# Patient Record
Sex: MALE | Race: BLACK OR AFRICAN AMERICAN | URBAN - METROPOLITAN AREA
[De-identification: names, ages, dates, MRNs, and addresses within clinical notes are randomized per-mention and may not be internally consistent; named-entity substitution may affect disease eponyms.]

---

## 2023-08-15 ENCOUNTER — TRANSCRIBE ORDERS (OUTPATIENT)
Facility: HOSPITAL | Age: 33
End: 2023-08-15

## 2023-08-15 DIAGNOSIS — H05.89 ORBITAL FLOOR SYNDROME: Primary | ICD-10-CM

## 2023-08-30 ENCOUNTER — HOSPITAL ENCOUNTER (OUTPATIENT)
Facility: HOSPITAL | Age: 33
Discharge: HOME | End: 2023-09-02
Payer: MEDICAID

## 2023-08-30 DIAGNOSIS — H05.89 ORBITAL FLOOR SYNDROME: ICD-10-CM

## 2023-08-30 PROCEDURE — 6360000004 HC RX CONTRAST MEDICATION: Performed by: OPHTHALMOLOGY

## 2023-08-30 PROCEDURE — 70482 CT ORBIT/EAR/FOSSA W/O&W/DYE: CPT

## 2023-08-30 RX ADMIN — IOPAMIDOL 100 ML: 755 INJECTION, SOLUTION INTRAVENOUS at 16:01

## 2023-11-07 ENCOUNTER — HOSPITAL ENCOUNTER (INPATIENT)
Facility: HOSPITAL | Age: 33
Discharge: HOME | End: 2023-11-10
Payer: MEDICAID

## 2023-11-07 ENCOUNTER — TRANSCRIBE ORDERS (OUTPATIENT)
Facility: HOSPITAL | Age: 33
End: 2023-11-07

## 2023-11-07 DIAGNOSIS — R06.02 SHORTNESS OF BREATH: ICD-10-CM

## 2023-11-07 DIAGNOSIS — R06.02 SHORTNESS OF BREATH: Primary | ICD-10-CM

## 2023-11-07 PROCEDURE — 71046 X-RAY EXAM CHEST 2 VIEWS: CPT

## 2023-12-31 ENCOUNTER — HOSPITAL ENCOUNTER (EMERGENCY)
Facility: HOSPITAL | Age: 33
Discharge: HOME | End: 2023-12-31
Attending: EMERGENCY MEDICINE
Payer: MEDICAID

## 2023-12-31 VITALS
RESPIRATION RATE: 16 BRPM | BODY MASS INDEX: 22.4 KG/M2 | OXYGEN SATURATION: 99 % | HEART RATE: 100 BPM | TEMPERATURE: 98.3 F | SYSTOLIC BLOOD PRESSURE: 146 MMHG | HEIGHT: 71 IN | WEIGHT: 160 LBS | DIASTOLIC BLOOD PRESSURE: 97 MMHG

## 2023-12-31 DIAGNOSIS — F41.1 ANXIETY STATE: Primary | ICD-10-CM

## 2023-12-31 DIAGNOSIS — F19.90 SUBSTANCE USE: ICD-10-CM

## 2023-12-31 PROCEDURE — 6370000000 HC RX 637 (ALT 250 FOR IP): Performed by: EMERGENCY MEDICINE

## 2023-12-31 PROCEDURE — 99283 EMERGENCY DEPT VISIT LOW MDM: CPT

## 2023-12-31 RX ORDER — ALPRAZOLAM 0.5 MG/1
0.5 TABLET ORAL
Status: COMPLETED | OUTPATIENT
Start: 2023-12-31 | End: 2023-12-31

## 2023-12-31 RX ADMIN — ALPRAZOLAM 0.5 MG: 0.5 TABLET ORAL at 21:10

## 2023-12-31 ASSESSMENT — LIFESTYLE VARIABLES
HOW OFTEN DO YOU HAVE A DRINK CONTAINING ALCOHOL: NEVER
HOW MANY STANDARD DRINKS CONTAINING ALCOHOL DO YOU HAVE ON A TYPICAL DAY: PATIENT DOES NOT DRINK

## 2023-12-31 ASSESSMENT — PAIN - FUNCTIONAL ASSESSMENT: PAIN_FUNCTIONAL_ASSESSMENT: NONE - DENIES PAIN

## 2024-01-01 NOTE — ED TRIAGE NOTES
States anxiety that started after going to a bar on 12/23 and \"given some drug but I don't know what it was\" States daily marijuana and ecstasy use.

## 2024-01-01 NOTE — ED PROVIDER NOTES
homicidal nor having auditory visual hallucinations or findings consistent with psychotic break.  Comfortable patient's plan for discharge.  He was given return precautions.    Clinical Management Tools:  Not Applicable    Records Reviewed (source and summary of external notes): Prior medical records and Nursing notes    Vitals:    Vitals:    12/31/23 2028   BP: (!) 146/97   Pulse: 100   Resp: 16   Temp: 98.3 °F (36.8 °C)   TempSrc: Oral   SpO2: 99%   Weight: 72.6 kg (160 lb)   Height: 1.803 m (5' 11\")        ED COURSE       Sepsis Reassessment: Sepsis reassessment not applicable    Disposition Considerations (Tests not done, Shared Decision Making, Pt Expectation of Test or Treatment.): Not Applicable    Patient was given the following medications:  Medications   ALPRAZolam (XANAX) tablet 0.5 mg (0.5 mg Oral Given 12/31/23 2110)       CONSULTS: (Who and What was discussed)  None     Social Determinants affecting Dx or Tx: Patient lacks a PCP.    Smoking Cessation: Not Applicable    PROCEDURES   Unless otherwise noted above, none.  Procedures      CRITICAL CARE TIME   Patient does not meet Critical Care Time, 0 minutes    FINAL IMPRESSION     1. Anxiety state    2. Substance use          DISPOSITION/PLAN   DISPOSITION Decision To Discharge 12/31/2023 09:12:45 PM    Discharge Note: The patient is stable for discharge home. The signs, symptoms, diagnosis, and discharge instructions have been discussed, understanding conveyed, and agreed upon. The patient is to follow up as recommended or return to ER should their symptoms worsen.      PATIENT REFERRED TO:  Cecelia Valdivia, APRN - NP  [address] [facility] [address]  [phone]    Schedule an appointment as soon as possible for a visit   for PCP ongoing care or follow-up with your own doctor if symptoms persist    [facility] EMERGENCY DEPARTMENT  [address] [place] [id number] [phone]    If symptoms worsen        DISCHARGE

## 2024-01-16 ENCOUNTER — HOSPITAL ENCOUNTER (EMERGENCY)
Facility: HOSPITAL | Age: 34
Discharge: HOME | End: 2024-01-16
Payer: MEDICAID

## 2024-01-16 VITALS
SYSTOLIC BLOOD PRESSURE: 140 MMHG | DIASTOLIC BLOOD PRESSURE: 86 MMHG | HEIGHT: 70 IN | HEART RATE: 65 BPM | TEMPERATURE: 98.3 F | OXYGEN SATURATION: 98 % | RESPIRATION RATE: 14 BRPM | WEIGHT: 183 LBS | BODY MASS INDEX: 26.2 KG/M2

## 2024-01-16 DIAGNOSIS — F12.90 MARIJUANA USE: ICD-10-CM

## 2024-01-16 DIAGNOSIS — F41.9 ANXIETY: Primary | ICD-10-CM

## 2024-01-16 PROCEDURE — 99282 EMERGENCY DEPT VISIT SF MDM: CPT

## 2024-01-16 ASSESSMENT — PAIN SCALES - GENERAL
PAINLEVEL_OUTOF10: 0
PAINLEVEL_OUTOF10: 0

## 2024-01-16 ASSESSMENT — PAIN - FUNCTIONAL ASSESSMENT
PAIN_FUNCTIONAL_ASSESSMENT: NONE - DENIES PAIN
PAIN_FUNCTIONAL_ASSESSMENT: NONE - DENIES PAIN
PAIN_FUNCTIONAL_ASSESSMENT: 0-10

## 2024-01-16 NOTE — ED TRIAGE NOTES
Pt says he had an \"anxiety attack\" last night and it continued on today. Pt denies SI/HI. Pt denies history of anxiety. Pt says when he is having an anxiety attack he just feels like \"a lot is going on\" but declined to give me any other information.

## 2024-01-17 NOTE — ED PROVIDER NOTES
computer software. Please disregards these errors. Please excuse any errors that have escaped final proofreading.)     Paula Bowers APRN - ERNESTINE  01/17/24 [id number]

## 2024-01-29 ENCOUNTER — HOSPITAL ENCOUNTER (EMERGENCY)
Facility: HOSPITAL | Age: 34
Discharge: HOME | End: 2024-01-29
Attending: STUDENT IN AN ORGANIZED HEALTH CARE EDUCATION/TRAINING PROGRAM
Payer: MEDICAID

## 2024-01-29 VITALS
HEIGHT: 70 IN | OXYGEN SATURATION: 98 % | SYSTOLIC BLOOD PRESSURE: 133 MMHG | BODY MASS INDEX: 25.77 KG/M2 | WEIGHT: 180 LBS | DIASTOLIC BLOOD PRESSURE: 81 MMHG | TEMPERATURE: 98.6 F | RESPIRATION RATE: 16 BRPM | HEART RATE: 82 BPM

## 2024-01-29 DIAGNOSIS — F22 PARANOIA (HCC): Primary | ICD-10-CM

## 2024-01-29 LAB
ALBUMIN SERPL-MCNC: 3.9 G/DL (ref 3.5–5)
ALBUMIN/GLOB SERPL: 1.1 (ref 1.1–2.2)
ALP SERPL-CCNC: 60 U/L (ref 45–117)
ALT SERPL-CCNC: 28 U/L (ref 12–78)
AMPHET UR QL SCN: NEGATIVE
ANION GAP SERPL CALC-SCNC: 2 MMOL/L (ref 5–15)
APAP SERPL-MCNC: <2 UG/ML (ref 10–30)
APPEARANCE UR: CLEAR
AST SERPL W P-5'-P-CCNC: 9 U/L (ref 15–37)
BACTERIA URNS QL MICRO: NEGATIVE /HPF
BARBITURATES UR QL SCN: NEGATIVE
BASOPHILS # BLD: 0 K/UL (ref 0–0.1)
BASOPHILS NFR BLD: 1 % (ref 0–1)
BENZODIAZ UR QL: NEGATIVE
BILIRUB SERPL-MCNC: 0.6 MG/DL (ref 0.2–1)
BILIRUB UR QL: NEGATIVE
BUN SERPL-MCNC: 16 MG/DL (ref 6–20)
BUN/CREAT SERPL: 10 (ref 12–20)
CA-I BLD-MCNC: 9.5 MG/DL (ref 8.5–10.1)
CANNABINOIDS UR QL SCN: POSITIVE
CHLORIDE SERPL-SCNC: 110 MMOL/L (ref 97–108)
CO2 SERPL-SCNC: 26 MMOL/L (ref 21–32)
COCAINE UR QL SCN: NEGATIVE
COLOR UR: NORMAL
CREAT SERPL-MCNC: 1.65 MG/DL (ref 0.7–1.3)
DIFFERENTIAL METHOD BLD: ABNORMAL
EOSINOPHIL # BLD: 0 K/UL (ref 0–0.4)
EOSINOPHIL NFR BLD: 1 % (ref 0–7)
EPITH CASTS URNS QL MICRO: NORMAL /LPF
ERYTHROCYTE [DISTWIDTH] IN BLOOD BY AUTOMATED COUNT: 12.9 % (ref 11.5–14.5)
ETHANOL SERPL-MCNC: <10 MG/DL (ref 0–0.08)
FLUAV RNA SPEC QL NAA+PROBE: NOT DETECTED
FLUBV RNA SPEC QL NAA+PROBE: NOT DETECTED
GLOBULIN SER CALC-MCNC: 3.6 G/DL (ref 2–4)
GLUCOSE SERPL-MCNC: 85 MG/DL (ref 65–100)
GLUCOSE UR STRIP.AUTO-MCNC: NEGATIVE MG/DL
HCT VFR BLD AUTO: 44.8 % (ref 36.6–50.3)
HGB BLD-MCNC: 15.4 G/DL (ref 12.1–17)
HGB UR QL STRIP: NEGATIVE
IMM GRANULOCYTES # BLD AUTO: 0 K/UL (ref 0–0.04)
IMM GRANULOCYTES NFR BLD AUTO: 0 % (ref 0–0.5)
KETONES UR QL STRIP.AUTO: NEGATIVE MG/DL
LEUKOCYTE ESTERASE UR QL STRIP.AUTO: NEGATIVE
LYMPHOCYTES # BLD: 1.7 K/UL (ref 0.8–3.5)
LYMPHOCYTES NFR BLD: 34 % (ref 12–49)
Lab: ABNORMAL
MCH RBC QN AUTO: 27.3 PG (ref 26–34)
MCHC RBC AUTO-ENTMCNC: 34.4 G/DL (ref 30–36.5)
MCV RBC AUTO: 79.3 FL (ref 80–99)
METHADONE UR QL: NEGATIVE
MONOCYTES # BLD: 0.5 K/UL (ref 0–1)
MONOCYTES NFR BLD: 10 % (ref 5–13)
MUCOUS THREADS URNS QL MICRO: NORMAL /LPF
NEUTS SEG # BLD: 2.8 K/UL (ref 1.8–8)
NEUTS SEG NFR BLD: 54 % (ref 32–75)
NITRITE UR QL STRIP.AUTO: NEGATIVE
NRBC # BLD: 0 K/UL (ref 0–0.01)
NRBC BLD-RTO: 0 PER 100 WBC
OPIATES UR QL: NEGATIVE
PCP UR QL: NEGATIVE
PH UR STRIP: 5 (ref 5–8)
PLATELET # BLD AUTO: 226 K/UL (ref 150–400)
PMV BLD AUTO: 11 FL (ref 8.9–12.9)
POTASSIUM SERPL-SCNC: 4 MMOL/L (ref 3.5–5.1)
PROT SERPL-MCNC: 7.5 G/DL (ref 6.4–8.2)
PROT UR STRIP-MCNC: NEGATIVE MG/DL
RBC # BLD AUTO: 5.65 M/UL (ref 4.1–5.7)
RBC #/AREA URNS HPF: NORMAL /HPF (ref 0–5)
SALICYLATES SERPL-MCNC: 4.7 MG/DL (ref 2.8–20)
SARS-COV-2 RNA RESP QL NAA+PROBE: NOT DETECTED
SODIUM SERPL-SCNC: 138 MMOL/L (ref 136–145)
SP GR UR REFRACTOMETRY: 1.02 (ref 1–1.03)
UROBILINOGEN UR QL STRIP.AUTO: 0.1 EU/DL (ref 0.1–1)
WBC # BLD AUTO: 5.1 K/UL (ref 4.1–11.1)
WBC URNS QL MICRO: NORMAL /HPF (ref 0–4)

## 2024-01-29 PROCEDURE — 82077 ASSAY SPEC XCP UR&BREATH IA: CPT

## 2024-01-29 PROCEDURE — 80179 DRUG ASSAY SALICYLATE: CPT

## 2024-01-29 PROCEDURE — 99283 EMERGENCY DEPT VISIT LOW MDM: CPT

## 2024-01-29 PROCEDURE — 81001 URINALYSIS AUTO W/SCOPE: CPT

## 2024-01-29 PROCEDURE — 80143 DRUG ASSAY ACETAMINOPHEN: CPT

## 2024-01-29 PROCEDURE — 80053 COMPREHEN METABOLIC PANEL: CPT

## 2024-01-29 PROCEDURE — 6370000000 HC RX 637 (ALT 250 FOR IP): Performed by: STUDENT IN AN ORGANIZED HEALTH CARE EDUCATION/TRAINING PROGRAM

## 2024-01-29 PROCEDURE — 36415 COLL VENOUS BLD VENIPUNCTURE: CPT

## 2024-01-29 PROCEDURE — 85025 COMPLETE CBC W/AUTO DIFF WBC: CPT

## 2024-01-29 PROCEDURE — 87636 SARSCOV2 & INF A&B AMP PRB: CPT

## 2024-01-29 PROCEDURE — 80307 DRUG TEST PRSMV CHEM ANLYZR: CPT

## 2024-01-29 RX ORDER — HYDROXYZINE HYDROCHLORIDE 25 MG/1
25 TABLET, FILM COATED ORAL ONCE
Status: COMPLETED | OUTPATIENT
Start: 2024-01-29 | End: 2024-01-29

## 2024-01-29 RX ADMIN — HYDROXYZINE HYDROCHLORIDE 25 MG: 25 TABLET, FILM COATED ORAL at 18:44

## 2024-01-29 ASSESSMENT — PAIN SCALES - GENERAL: PAINLEVEL_OUTOF10: 0

## 2024-01-29 ASSESSMENT — LIFESTYLE VARIABLES
HOW MANY STANDARD DRINKS CONTAINING ALCOHOL DO YOU HAVE ON A TYPICAL DAY: PATIENT DOES NOT DRINK
HOW OFTEN DO YOU HAVE A DRINK CONTAINING ALCOHOL: NEVER
HOW MANY STANDARD DRINKS CONTAINING ALCOHOL DO YOU HAVE ON A TYPICAL DAY: PATIENT DOES NOT DRINK

## 2024-01-29 NOTE — ED TRIAGE NOTES
Pt arrived via ems from [facility] with Erratic Behavior , smoked marijuana prior to getting to [facility].     Arrived at [facility] at 2 pm as a self admit.     Has been having drug induced psychosis from marijuana

## 2024-01-30 NOTE — DISCHARGE INSTRUCTIONS
Thank you!  Thank you for allowing me to care for you in the emergency department. It is my goal to provide you with excellent care.  Please fill out the survey that will come to you by mail or email since we listen to your feedback!     Below you will find a list of your tests from today's visit.  Should you have any questions, please do not hesitate to call the emergency department.    Labs  Recent Results (from the past 12 hour(s))   Acetaminophen Level    Collection Time: 01/29/24  5:21 PM   Result Value Ref Range    Acetaminophen Level <2 (L) 10 - 30 ug/mL   CBC with Auto Differential    Collection Time: 01/29/24  5:21 PM   Result Value Ref Range    WBC 5.1 4.1 - 11.1 K/uL    RBC 5.65 4.10 - 5.70 M/uL    Hemoglobin 15.4 12.1 - 17.0 g/dL    Hematocrit 44.8 36.6 - 50.3 %    MCV 79.3 (L) 80.0 - 99.0 FL    MCH 27.3 26.0 - 34.0 PG    MCHC 34.4 30.0 - 36.5 g/dL    RDW 12.9 11.5 - 14.5 %    Platelets 226 150 - 400 K/uL    MPV 11.0 8.9 - 12.9 FL    Nucleated RBCs 0.0 0.0  WBC    nRBC 0.00 0.00 - 0.01 K/uL    Neutrophils % 54 32 - 75 %    Lymphocytes % 34 12 - 49 %    Monocytes % 10 5 - 13 %    Eosinophils % 1 0 - 7 %    Basophils % 1 0 - 1 %    Immature Granulocytes 0 0 - 0.5 %    Neutrophils Absolute 2.8 1.8 - 8.0 K/UL    Lymphocytes Absolute 1.7 0.8 - 3.5 K/UL    Monocytes Absolute 0.5 0.0 - 1.0 K/UL    Eosinophils Absolute 0.0 0.0 - 0.4 K/UL    Basophils Absolute 0.0 0.0 - 0.1 K/UL    Absolute Immature Granulocyte 0.0 0.00 - 0.04 K/UL    Differential Type AUTOMATED     Comprehensive Metabolic Panel    Collection Time: 01/29/24  5:21 PM   Result Value Ref Range    Sodium 138 136 - 145 mmol/L    Potassium 4.0 3.5 - 5.1 mmol/L    Chloride 110 (H) 97 - 108 mmol/L    CO2 26 21 - 32 mmol/L    Anion Gap 2 (L) 5 - 15 mmol/L    Glucose 85 65 - 100 mg/dL    BUN 16 6 - 20 mg/dL    Creatinine 1.65 (H) 0.70 - 1.30 mg/dL    Bun/Cre Ratio 10 (L) 12 - 20      Est, Glom Filt Rate 56 (L) >60 ml/min/1.73m2    Calcium 9.5

## 2024-01-30 NOTE — ED PROVIDER NOTES
[facility] EMERGENCY DEPT  EMERGENCY DEPARTMENT HISTORY AND PHYSICAL EXAM      Date: 1/29/2024  Patient Name: Yesenia Heller  MRN: [id number]  Birthdate 1990  Date of evaluation: 1/29/2024  Provider: Urbano Montemayor MD   Note Started: 10:31 PM EST 1/29/24    HISTORY OF PRESENT ILLNESS     Chief Complaint   Patient presents with    Behavorial Issue       History Provided By: Patient, psychiatric provider    HPI: Yesenia Heller is a 33 y.o. male with past medical history as reviewed below presents for evaluation of paranoia.  Patient initially presented to [facility] for voluntary admission.  They sent him here for medical screening and clearance.  Patient has no complaints at this time.  Denies any medical history.    PAST MEDICAL HISTORY   Past Medical History:  Past Medical History:   Diagnosis Date    Asthma        Past Surgical History:  History reviewed. No pertinent surgical history.    Family History:  History reviewed. No pertinent family history.    Social History:  Social History     Tobacco Use    Smoking status: Every Day     Current packs/day: 2.00     Average packs/day: 2.0 packs/day for 2.1 years (4.2 ttl pk-yrs)     Types: Cigarettes     Start date: 12/16/2021    Smokeless tobacco: Never   Substance Use Topics    Alcohol use: Not Currently    Drug use: Yes     Types: Marijuana (Weed), Other-see comments     Comment: ecstasy       Allergies:  No Known Allergies    PCP: None, None    Current Meds:   No current facility-administered medications for this encounter.     No current outpatient medications on file.       Social Determinants of Health:   Social Determinants of Health     Tobacco Use: High Risk (1/29/2024)    Patient History     Smoking Tobacco Use: Every Day     Smokeless Tobacco Use: Never     Passive Exposure: Not on file   Alcohol Use: Not At Risk (1/29/2024)    AUDIT-C     Frequency of Alcohol Consumption: Never     Average Number of Drinks: Patient does not drink     Frequency

## 2024-05-08 PROBLEM — N28.9 ABNORMAL RENAL FUNCTION: Status: ACTIVE | Noted: 2024-05-08

## 2024-05-08 PROBLEM — F17.200 NICOTINE DEPENDENCE: Status: ACTIVE | Noted: 2024-05-08

## 2024-05-08 PROBLEM — Z11.59 NEED FOR HEPATITIS C SCREENING TEST: Status: ACTIVE | Noted: 2024-05-08

## 2024-05-16 ENCOUNTER — OFFICE VISIT (OUTPATIENT)
Facility: CLINIC | Age: 34
End: 2024-05-16
Payer: MEDICAID

## 2024-05-16 VITALS
HEIGHT: 69 IN | HEART RATE: 69 BPM | RESPIRATION RATE: 18 BRPM | DIASTOLIC BLOOD PRESSURE: 74 MMHG | BODY MASS INDEX: 26.9 KG/M2 | SYSTOLIC BLOOD PRESSURE: 126 MMHG | OXYGEN SATURATION: 98 % | WEIGHT: 181.6 LBS | TEMPERATURE: 98.6 F

## 2024-05-16 DIAGNOSIS — F29 PSYCHOSIS, UNSPECIFIED PSYCHOSIS TYPE (HCC): ICD-10-CM

## 2024-05-16 DIAGNOSIS — Z11.4 SCREENING FOR HIV (HUMAN IMMUNODEFICIENCY VIRUS): ICD-10-CM

## 2024-05-16 DIAGNOSIS — F17.218 CIGARETTE NICOTINE DEPENDENCE WITH OTHER NICOTINE-INDUCED DISORDER: ICD-10-CM

## 2024-05-16 DIAGNOSIS — Z11.59 NEED FOR HEPATITIS C SCREENING TEST: ICD-10-CM

## 2024-05-16 DIAGNOSIS — N28.9 ABNORMAL RENAL FUNCTION: ICD-10-CM

## 2024-05-16 DIAGNOSIS — E55.9 VITAMIN D DEFICIENCY: ICD-10-CM

## 2024-05-16 DIAGNOSIS — F19.11 HISTORY OF DRUG ABUSE (HCC): ICD-10-CM

## 2024-05-16 PROCEDURE — 99204 OFFICE O/P NEW MOD 45 MIN: CPT | Performed by: INTERNAL MEDICINE

## 2024-05-16 RX ORDER — RISPERIDONE 2 MG/1
2 TABLET ORAL 2 TIMES DAILY
COMMUNITY
Start: 2024-02-09

## 2024-05-16 SDOH — ECONOMIC STABILITY: FOOD INSECURITY: WITHIN THE PAST 12 MONTHS, YOU WORRIED THAT YOUR FOOD WOULD RUN OUT BEFORE YOU GOT MONEY TO BUY MORE.: OFTEN TRUE

## 2024-05-16 SDOH — ECONOMIC STABILITY: FOOD INSECURITY: WITHIN THE PAST 12 MONTHS, THE FOOD YOU BOUGHT JUST DIDN'T LAST AND YOU DIDN'T HAVE MONEY TO GET MORE.: OFTEN TRUE

## 2024-05-16 SDOH — ECONOMIC STABILITY: INCOME INSECURITY: HOW HARD IS IT FOR YOU TO PAY FOR THE VERY BASICS LIKE FOOD, HOUSING, MEDICAL CARE, AND HEATING?: HARD

## 2024-05-16 SDOH — ECONOMIC STABILITY: HOUSING INSECURITY
IN THE LAST 12 MONTHS, WAS THERE A TIME WHEN YOU DID NOT HAVE A STEADY PLACE TO SLEEP OR SLEPT IN A SHELTER (INCLUDING NOW)?: NO

## 2024-05-16 ASSESSMENT — PATIENT HEALTH QUESTIONNAIRE - PHQ9
5. POOR APPETITE OR OVEREATING: NOT AT ALL
8. MOVING OR SPEAKING SO SLOWLY THAT OTHER PEOPLE COULD HAVE NOTICED. OR THE OPPOSITE, BEING SO FIGETY OR RESTLESS THAT YOU HAVE BEEN MOVING AROUND A LOT MORE THAN USUAL: NOT AT ALL
9. THOUGHTS THAT YOU WOULD BE BETTER OFF DEAD, OR OF HURTING YOURSELF: NOT AT ALL
2. FEELING DOWN, DEPRESSED OR HOPELESS: NOT AT ALL
1. LITTLE INTEREST OR PLEASURE IN DOING THINGS: NOT AT ALL
3. TROUBLE FALLING OR STAYING ASLEEP: NOT AT ALL
7. TROUBLE CONCENTRATING ON THINGS, SUCH AS READING THE NEWSPAPER OR WATCHING TELEVISION: NOT AT ALL
4. FEELING TIRED OR HAVING LITTLE ENERGY: NOT AT ALL
SUM OF ALL RESPONSES TO PHQ QUESTIONS 1-9: 0
SUM OF ALL RESPONSES TO PHQ QUESTIONS 1-9: 0
SUM OF ALL RESPONSES TO PHQ9 QUESTIONS 1 & 2: 0
SUM OF ALL RESPONSES TO PHQ QUESTIONS 1-9: 0
SUM OF ALL RESPONSES TO PHQ QUESTIONS 1-9: 0
10. IF YOU CHECKED OFF ANY PROBLEMS, HOW DIFFICULT HAVE THESE PROBLEMS MADE IT FOR YOU TO DO YOUR WORK, TAKE CARE OF THINGS AT HOME, OR GET ALONG WITH OTHER PEOPLE: NOT DIFFICULT AT ALL
6. FEELING BAD ABOUT YOURSELF - OR THAT YOU ARE A FAILURE OR HAVE LET YOURSELF OR YOUR FAMILY DOWN: NOT AT ALL

## 2024-05-16 ASSESSMENT — ENCOUNTER SYMPTOMS
RESPIRATORY NEGATIVE: 1
GASTROINTESTINAL NEGATIVE: 1
ALLERGIC/IMMUNOLOGIC NEGATIVE: 1
EYES NEGATIVE: 1

## 2024-05-16 NOTE — PROGRESS NOTES
Yesenia Heller (: 1990) is a 34 y.o. male, New patient patient, here for evaluation of the following chief complaint(s):  New Patient and Annual Exam         ASSESSMENT/PLAN:  Below is the assessment and plan developed based on review of pertinent history, physical exam, labs, studies, and medications.      1. Psychosis, unspecified psychosis type (HCC)  -     Roger Tena MD, Psychiatry, [place]  -     -DRUG SCREEN, URINE; Future  2. Abnormal renal function  -     CBC with Auto Differential; Future  -     Comprehensive Metabolic Panel; Future  3. History of drug abuse (HCC)  -     Roger Tena MD, Psychiatry, [place]  -     -DRUG SCREEN, URINE; Future  4. Vitamin D deficiency  -     Vitamin D 25 Hydroxy; Future  5. Need for hepatitis C screening test  -     Hepatitis C Antibody; Future  6. Cigarette nicotine dependence with other nicotine-induced disorder  7. Screening for HIV (human immunodeficiency virus)  -     HIV 1/2 Ag/Ab, 4TH Generation,W Rflx Confirm; Future        Mr. Heller is new patient to me.  He is not providing any history.  He does not know who has referred him.  He says he was in [place] admitted for 11 days for psychosis.  He does not know what mental health problems he has.  He has been admitted repeatedly in the ER for behavioral problem issues and he has been getting risperidone.  He could not give me any history where does he follow and who is his psychiatrist.    He says he lives with his mother and his children and he has his girlfriend.  In the past he used to take marijuana and ecstasy and to drink excessively but he says now he does not drink.  He smokes 1 cigarette lasting for 2 days.    In the past she had abnormal renal function.  He does not know that.    He should not take OTC ibuprofen Aleve like NSAID group of medicines except Tylenol.    He was admitted on 2023, 2024 and 2024.  He used to have substance

## 2024-05-16 NOTE — PATIENT INSTRUCTIONS
[facility] Food Resources*  (Call 211 if need more resources.)    SNAP (formerly Food [employer])  What they offer: SNAP is used like cash to buy eligible food items from authorized retailers.  Apply for benefits online: https://www.commonhelp.[place].gov/  Apply for benefits by phone: [phone] (M-F 8 am - 7 pm; Sat 9 am - 12 pm)    Feed [facility] Emergency Food Hotline  What they offer: Assistance with finding a food pantry, soup kitchen, or resource near you.  Website:  https://Breezeplay.org/agency-network/agency-/ (search zip code)  Hunger Hotline Inquiry Form: https://Breezeplay.org/hunger-hotline-inquiry-form/  Hunger Hotline Phone Number:  804-521-2500 x 631 (M-F 8:00 am-4:00 pm)    Meals on Wheels  Meals on Wheels is a program that delivers meals to individuals who have no reliable means for maintaining a healthy diet.  Services are provided by area.     [facility] Service Area:   [facility], [place], and [place].  [facility], [place], [facility], [facility], [place], [place], and [place]  Website:  https://Breezeplay.org/how-we-help/meals-on-wheels/  To Apply:  Ages 18-59:  Apply online: https://Breezeplay.org/meals-on-wheels-application-form/  Apply by phone: [phone]        Meals on Wheels  [facility] Area (continued):  To Apply:  Ages 60+:  Apply Online: https://Breezeplay.org/meals-on-wheels-application-form/  Apply By Phone: [phone] (M-F 830 am- 5 pm)  For [facility], [facility], [place], [place], [place], [place], [place] and [place]: You may also apply by calling [employer], [facility] Agency on Aging at [phone].  For [facility], [place], and [place] as well as [facility], [place], [place], and [place]:    Contact [facility] Agency on [phone]    [facility] Service Area:   [facility] [place], [place], [place] &

## 2024-05-16 NOTE — PROGRESS NOTES
\"Have you been to the ER, urgent care clinic since your last visit?  Hospitalized since your last visit?\"    YES - When: approximately 01/29/2024 ago.  Where and Why: [facility] for paranoia.    “Have you seen or consulted any other health care providers outside of [facility] since your last visit?”    YES - When: approximately April 2024 ago.  Where and Why: [place] was trying to see a psychiatrist.    Chief Complaint   Patient presents with    New Patient    Annual Exam     /74 (Site: Left Upper Arm, Position: Sitting, Cuff Size: Medium Adult)   Pulse 69   Temp 98.6 °F (37 °C) (Oral)   Resp 18   Ht 1.753 m (5' 9\")   Wt 82.4 kg (181 lb 9.6 oz)   SpO2 98%   BMI 26.82 kg/m²           Click Here for Release of Records Request

## 2024-05-17 DIAGNOSIS — N18.31 STAGE 3A CHRONIC KIDNEY DISEASE (HCC): Primary | ICD-10-CM

## 2024-05-17 LAB
25(OH)D3+25(OH)D2 SERPL-MCNC: 36 NG/ML (ref 30–100)
ALBUMIN SERPL-MCNC: 4.9 G/DL (ref 4.1–5.1)
ALBUMIN/GLOB SERPL: 2 {RATIO} (ref 1.2–2.2)
ALP SERPL-CCNC: 74 IU/L (ref 44–121)
ALT SERPL-CCNC: 22 IU/L (ref 0–44)
AST SERPL-CCNC: 16 IU/L (ref 0–40)
BASOPHILS # BLD AUTO: 0.1 X10E3/UL (ref 0–0.2)
BASOPHILS NFR BLD AUTO: 2 %
BILIRUB SERPL-MCNC: 0.4 MG/DL (ref 0–1.2)
BUN SERPL-MCNC: 16 MG/DL (ref 6–20)
BUN/CREAT SERPL: 9 (ref 9–20)
CALCIUM SERPL-MCNC: 9.6 MG/DL (ref 8.7–10.2)
CHLORIDE SERPL-SCNC: 104 MMOL/L (ref 96–106)
CO2 SERPL-SCNC: 26 MMOL/L (ref 20–29)
CREAT SERPL-MCNC: 1.71 MG/DL (ref 0.76–1.27)
EGFRCR SERPLBLD CKD-EPI 2021: 53 ML/MIN/1.73
EOSINOPHIL # BLD AUTO: 0.2 X10E3/UL (ref 0–0.4)
EOSINOPHIL NFR BLD AUTO: 6 %
ERYTHROCYTE [DISTWIDTH] IN BLOOD BY AUTOMATED COUNT: 13.2 % (ref 11.6–15.4)
GLOBULIN SER CALC-MCNC: 2.5 G/DL (ref 1.5–4.5)
GLUCOSE SERPL-MCNC: 78 MG/DL (ref 70–99)
HCT VFR BLD AUTO: 41.7 % (ref 37.5–51)
HCV IGG SERPL QL IA: NON REACTIVE
HGB BLD-MCNC: 14 G/DL (ref 13–17.7)
HIV 1+2 AB+HIV1 P24 AG SERPL QL IA: NON REACTIVE
IMM GRANULOCYTES # BLD AUTO: 0 X10E3/UL (ref 0–0.1)
IMM GRANULOCYTES NFR BLD AUTO: 0 %
LYMPHOCYTES # BLD AUTO: 1.5 X10E3/UL (ref 0.7–3.1)
LYMPHOCYTES NFR BLD AUTO: 39 %
MCH RBC QN AUTO: 27.5 PG (ref 26.6–33)
MCHC RBC AUTO-ENTMCNC: 33.6 G/DL (ref 31.5–35.7)
MCV RBC AUTO: 82 FL (ref 79–97)
MONOCYTES # BLD AUTO: 0.4 X10E3/UL (ref 0.1–0.9)
MONOCYTES NFR BLD AUTO: 10 %
NEUTROPHILS # BLD AUTO: 1.7 X10E3/UL (ref 1.4–7)
NEUTROPHILS NFR BLD AUTO: 43 %
PLATELET # BLD AUTO: 228 X10E3/UL (ref 150–450)
POTASSIUM SERPL-SCNC: 4.8 MMOL/L (ref 3.5–5.2)
PROT SERPL-MCNC: 7.4 G/DL (ref 6–8.5)
RBC # BLD AUTO: 5.09 X10E6/UL (ref 4.14–5.8)
SODIUM SERPL-SCNC: 142 MMOL/L (ref 134–144)
WBC # BLD AUTO: 3.9 X10E3/UL (ref 3.4–10.8)

## 2024-05-18 LAB
AMPHETAMINES UR QL SCN: NEGATIVE NG/ML
BARBITURATES UR QL SCN: NEGATIVE NG/ML
BENZODIAZ UR QL SCN: NEGATIVE NG/ML
BUPRENORPHINE UR QL: NEGATIVE NG/ML
BZE UR QL SCN: NEGATIVE NG/ML
CANNABINOIDS UR QL SCN: NEGATIVE NG/ML
CREAT UR-MCNC: 249 MG/DL (ref 20–300)
LABORATORY COMMENT REPORT: NORMAL
METHADONE UR QL SCN: NEGATIVE NG/ML
OPIATES UR QL SCN: NEGATIVE NG/ML
OXYCODONE+OXYMORPHONE UR QL SCN: NEGATIVE NG/ML
PCP UR QL: NEGATIVE NG/ML
PH UR: 5.3 [PH] (ref 4.5–8.9)
PROPOXYPH UR QL SCN: NEGATIVE NG/ML